# Patient Record
Sex: FEMALE | Race: OTHER | URBAN - METROPOLITAN AREA
[De-identification: names, ages, dates, MRNs, and addresses within clinical notes are randomized per-mention and may not be internally consistent; named-entity substitution may affect disease eponyms.]

---

## 2023-07-19 ENCOUNTER — INITIAL PRENATAL (OUTPATIENT)
Age: 33
End: 2023-07-19

## 2023-07-19 VITALS
WEIGHT: 169 LBS | HEART RATE: 82 BPM | RESPIRATION RATE: 17 BRPM | HEIGHT: 65 IN | DIASTOLIC BLOOD PRESSURE: 57 MMHG | BODY MASS INDEX: 28.16 KG/M2 | SYSTOLIC BLOOD PRESSURE: 99 MMHG | TEMPERATURE: 97.7 F | OXYGEN SATURATION: 97 %

## 2023-07-19 DIAGNOSIS — Z76.89 ENCOUNTER TO ESTABLISH CARE: ICD-10-CM

## 2023-07-19 DIAGNOSIS — R30.0 DYSURIA: ICD-10-CM

## 2023-07-19 DIAGNOSIS — Z34.90 PREGNANCY, UNSPECIFIED GESTATIONAL AGE: Primary | ICD-10-CM

## 2023-07-19 DIAGNOSIS — N30.00 ACUTE CYSTITIS WITHOUT HEMATURIA: ICD-10-CM

## 2023-07-19 LAB
BILIRUBIN, URINE, POC: NEGATIVE
BLOOD URINE, POC: NEGATIVE
GLUCOSE URINE, POC: NEGATIVE
KETONES, URINE, POC: NORMAL
LEUKOCYTE ESTERASE, URINE, POC: NORMAL
NITRITE, URINE, POC: POSITIVE
PH, URINE, POC: 6 (ref 4.6–8)
PROTEIN,URINE, POC: NORMAL
SPECIFIC GRAVITY, URINE, POC: 1.02 (ref 1–1.03)
URINALYSIS CLARITY, POC: NORMAL
URINALYSIS COLOR, POC: NORMAL
UROBILINOGEN, POC: NORMAL

## 2023-07-19 PROCEDURE — 81003 URINALYSIS AUTO W/O SCOPE: CPT

## 2023-07-19 RX ORDER — CEPHALEXIN 500 MG/1
500 CAPSULE ORAL 4 TIMES DAILY
Qty: 28 CAPSULE | Refills: 0 | Status: SHIPPED | OUTPATIENT
Start: 2023-07-19 | End: 2023-07-26

## 2023-07-19 RX ORDER — IRON FUM,PS/FOLIC ACID/VITC/B3 125-1-40-3
1 CAPSULE ORAL DAILY
Qty: 90 CAPSULE | Refills: 3 | Status: SHIPPED | OUTPATIENT
Start: 2023-07-19

## 2023-07-19 ASSESSMENT — PATIENT HEALTH QUESTIONNAIRE - PHQ9
SUM OF ALL RESPONSES TO PHQ QUESTIONS 1-9: 1
2. FEELING DOWN, DEPRESSED OR HOPELESS: 0
SUM OF ALL RESPONSES TO PHQ QUESTIONS 1-9: 1
1. LITTLE INTEREST OR PLEASURE IN DOING THINGS: 1
SUM OF ALL RESPONSES TO PHQ9 QUESTIONS 1 & 2: 1

## 2023-07-22 LAB
BACTERIA SPEC CULT: ABNORMAL
CC UR VC: ABNORMAL
SERVICE CMNT-IMP: ABNORMAL

## 2023-07-25 NOTE — PROGRESS NOTES
Return OB Visit     Jorge Fregoso interpretor used due to language barrier  Subjective:   Joaquín Mcduffie 26 y.o.  at 36w3d  Estimated Date of Delivery: 23 by LMP    Recently moved from [place] and was receiving prenatal care there. 2 prior CS. Desires a third. Failure to progress was indication for first CS. Living with a friend here. Family is in [facility]. FOB lives in [facility] and is supportive. Works there. Patient reports feeling well. No new concerns at this time. Patient denies headache, visual disturbances, CP, SOB, RUQ pain. LOF: No  Vaginal bleeding: No  Fetal movement (after 20 weeks): Yes  Contractions: no  Taking prenatal vitamins: taking    Allergies- reviewed:   No Known Allergies  Medications- reviewed:   Current Outpatient Medications   Medication Sig    Fe Fum-FePoly-FA-Vit C-Vit B3 (FOLIVANE-F) 125-1 MG CAPS Take 1 tablet by mouth daily     No current facility-administered medications for this visit. Past Medical History- reviewed:  No past medical history on file.   Past Surgical History- reviewed:   Past Surgical History:   Procedure Laterality Date     SECTION  2005     Social History- reviewed:  Social History     Socioeconomic History    Marital status: Single     Spouse name: Not on file    Number of children: Not on file    Years of education: Not on file    Highest education level: Not on file   Occupational History    Not on file   Tobacco Use    Smoking status: Never    Smokeless tobacco: Never   Substance and Sexual Activity    Alcohol use: Never    Drug use: Never    Sexual activity: Not on file   Other Topics Concern    Not on file   Social History Narrative    Not on file     Social Determinants of Health     Financial Resource Strain: Not on file   Food Insecurity: Not on file   Transportation Needs: Not on file   Physical Activity: Not on file   Stress: Not on file   Social Connections: Not on file   Intimate Partner Violence: Not on file

## 2023-07-26 ENCOUNTER — ROUTINE PRENATAL (OUTPATIENT)
Age: 33
End: 2023-07-26

## 2023-07-26 VITALS
HEIGHT: 65 IN | SYSTOLIC BLOOD PRESSURE: 98 MMHG | TEMPERATURE: 97.9 F | DIASTOLIC BLOOD PRESSURE: 59 MMHG | HEART RATE: 96 BPM | WEIGHT: 170.4 LBS | BODY MASS INDEX: 28.39 KG/M2 | OXYGEN SATURATION: 96 % | RESPIRATION RATE: 13 BRPM

## 2023-07-26 DIAGNOSIS — Z3A.36 36 WEEKS GESTATION OF PREGNANCY: Primary | ICD-10-CM

## 2023-07-26 PROCEDURE — 90715 TDAP VACCINE 7 YRS/> IM: CPT

## 2023-07-26 PROCEDURE — 0502F SUBSEQUENT PRENATAL CARE: CPT

## 2023-07-26 NOTE — PROGRESS NOTES
Chief Complaint   Patient presents with    Routine Prenatal Visit     Declined any major concerns. Patient identified with 2 patient identifiers (name and D. O. B)    Patient is a  at 36w3d    Leakage of Fluid: NO  Vaginal Bleeding: NO  Fetal Movement if > 20 weeks: YES  Prenatal vitamins: YES  Having Contractions: NO  Pain: NO    LMP 2022       There is no immunization history on file for this patient. 1. Have you been to the ER, urgent care clinic since your last visit? Hospitalized since your last visit? No    2. Have you seen or consulted any other health care providers outside of the [address] [place] [address] since your last visit? Include any pap smears or colon screening.  No

## 2023-07-27 LAB — GLUCOSE 1H P 100 G GLC PO SERPL-MCNC: 133 MG/DL (ref 65–140)

## 2023-07-30 LAB
BACTERIA SPEC CULT: NORMAL
SERVICE CMNT-IMP: NORMAL

## 2023-08-02 ENCOUNTER — ROUTINE PRENATAL (OUTPATIENT)
Age: 33
End: 2023-08-02

## 2023-08-02 VITALS
HEIGHT: 65 IN | OXYGEN SATURATION: 97 % | RESPIRATION RATE: 20 BRPM | DIASTOLIC BLOOD PRESSURE: 59 MMHG | HEART RATE: 90 BPM | BODY MASS INDEX: 30.32 KG/M2 | WEIGHT: 182 LBS | TEMPERATURE: 97.7 F | SYSTOLIC BLOOD PRESSURE: 98 MMHG

## 2023-08-02 DIAGNOSIS — N30.00 ACUTE CYSTITIS WITHOUT HEMATURIA: ICD-10-CM

## 2023-08-02 DIAGNOSIS — Z3A.37 37 WEEKS GESTATION OF PREGNANCY: Primary | ICD-10-CM

## 2023-08-02 PROCEDURE — 0502F SUBSEQUENT PRENATAL CARE: CPT

## 2023-08-02 SDOH — ECONOMIC STABILITY: INCOME INSECURITY: HOW HARD IS IT FOR YOU TO PAY FOR THE VERY BASICS LIKE FOOD, HOUSING, MEDICAL CARE, AND HEATING?: NOT HARD AT ALL

## 2023-08-02 SDOH — ECONOMIC STABILITY: HOUSING INSECURITY
IN THE LAST 12 MONTHS, WAS THERE A TIME WHEN YOU DID NOT HAVE A STEADY PLACE TO SLEEP OR SLEPT IN A SHELTER (INCLUDING NOW)?: NO

## 2023-08-02 SDOH — ECONOMIC STABILITY: FOOD INSECURITY: WITHIN THE PAST 12 MONTHS, THE FOOD YOU BOUGHT JUST DIDN'T LAST AND YOU DIDN'T HAVE MONEY TO GET MORE.: NEVER TRUE

## 2023-08-02 SDOH — ECONOMIC STABILITY: FOOD INSECURITY: WITHIN THE PAST 12 MONTHS, YOU WORRIED THAT YOUR FOOD WOULD RUN OUT BEFORE YOU GOT MONEY TO BUY MORE.: NEVER TRUE

## 2023-08-02 NOTE — PROGRESS NOTES
I reviewed with the resident the medical history and the resident's findings on the physical examination. I discussed with the resident the patient's diagnosis and concur with the plan.     27yo  at 37w3d      IUP: Rh pos  normal anatomy - unknown placental positioning  GBS neg  Abnormal GTT: 133, needs 3hr GTT   Transfer of care: at 35wk from [facility]/[place]  labs on cell phone - attempting to obtain complete records today   AMA  Hx CS x2: repeat scheduled 8/15 at 9:30, pt to arrive at 7:30 fasting - inform next visit, chart sent to Me[facility] Mcgregor (may need to reschedule if desires salpingectomy)   UTI: E. Coli, Keflex, culture LAINEY today     Desires tubal - will try to coordinate next week doing at [facility] versus switching to salpingectomy here   Unsure if applied Medicaid   Estimated Date of Delivery: 23

## 2023-08-02 NOTE — PROGRESS NOTES
Return OB Visit       Subjective:   Boone Kuo 26 y.o. [id number]  THAD: 2023, Alternate THAD Entry  GA:  37w3d. LOF: no  Vaginal bleeding: no  Fetal movement (after 20 weeks): yes  Contractions: no  Prenatal vitamins: yes    Pt denies fever, chills, HA, vision disturbances, RUQ pain, chest pain, SOB, N/V, urinary problems, foul smelling vaginal discharge. Allergies- reviewed:   No Known Allergies  Medications- reviewed:   Current Outpatient Medications   Medication Sig    nitrofurantoin, macrocrystal-monohydrate, (MACROBID) 100 MG capsule Take 1 capsule by mouth 2 times daily for 10 days    Fe Fum-FePoly-FA-Vit C-Vit B3 (FOLIVANE-F) 125-1 MG CAPS Take 1 tablet by mouth daily     No current facility-administered medications for this visit. Past Medical History- reviewed:  No past medical history on file. Past Surgical History- reviewed:   Past Surgical History:   Procedure Laterality Date     SECTION  2005     Social History- reviewed:  Social History     Socioeconomic History    Marital status: Single     Spouse name: Not on file    Number of children: Not on file    Years of education: Not on file    Highest education level: Not on file   Occupational History    Not on file   Tobacco Use    Smoking status: Never    Smokeless tobacco: Never   Substance and Sexual Activity    Alcohol use: Never    Drug use: Never    Sexual activity: Not on file   Other Topics Concern    Not on file   Social History Narrative    Not on file     Social Determinants of Health     Financial Resource Strain: Low Risk     Difficulty of Paying Living Expenses: Not hard at all   Food Insecurity: No Food Insecurity    Worried About Running Out of Food in the Last Year: Never true    [address] in the Last Year: Never true   Transportation Needs: Unknown    Lack of Transportation (Medical): Not on file    Lack of Transportation (Non-Medical):  No   Physical Activity: Not on file   Stress: Not

## 2023-08-02 NOTE — PROGRESS NOTES
Session [id number]  : [id number]  Here today for routine prenatal visit. Scheduled for    Denies abdominal pain and contractions. Reports lower back pain  Denies vaginal bleeding, fluid, and discharge  Reports active fetal movement.

## 2023-08-03 ENCOUNTER — NURSE ONLY (OUTPATIENT)
Age: 33
End: 2023-08-03

## 2023-08-03 DIAGNOSIS — Z3A.37 37 WEEKS GESTATION OF PREGNANCY: ICD-10-CM

## 2023-08-03 DIAGNOSIS — N30.00 ACUTE CYSTITIS WITHOUT HEMATURIA: ICD-10-CM

## 2023-08-03 DIAGNOSIS — R42 DIZZINESS: Primary | ICD-10-CM

## 2023-08-03 LAB — GLUCOSE, POC: 187 MG/DL

## 2023-08-03 PROCEDURE — 82962 GLUCOSE BLOOD TEST: CPT | Performed by: FAMILY MEDICINE

## 2023-08-03 PROCEDURE — PBSHW AMB POC GLUCOSE BLOOD, BY GLUCOSE MONITORING DEVICE: Performed by: FAMILY MEDICINE

## 2023-08-03 NOTE — PROGRESS NOTES
Patient was here today to get her 3 hour GTT due to previous abnormal 1 hour GTT. Patient is 37 weeks and 4 days. She was at to [facility] with no previous history of gestational diabetes. lea Michelle brought to my attention that the patient was sitting at the lab and was feeling bad. I went over to the patient and she stated she was having chills, body pain and dizziness. We helped her up into an exam table and laid her down on her left side with her feet elevated. We put a blanket and sheet over her since she stated she was freezing. Nely Almaguer LPN checked her vitals. Vitals were normal Glucose was also checked. BP: 100/70  Ox:96  P: 105  Temp 97.3  Glucose:187   RAPHAEL Christensen gave patient a cup of water. We observed patient for 5 minutes. I asked patient how she was felling and she said she starting to feel better. I monitered patient until 1:38 am. I rechecked her vitals. /70  Temp:97.5  P:88  Ox:97  Patient mentioned she was not feeling dizzy anymore, but was starting to get a headache. I gave her more water. I asked patient when she last ate something and she stated 9 pm last night. I asked patient if she was okay to sit up and she said yes. She sat on edge of the table for at least 5 minutes. She said she was good to stand up and sit in the lab chair. Patient was moved over and was well. She appeared to be doing better. I gave the patient the last cup of water and asked her to please let us know if she starts feeling bad again. I verbally talked to lea Michelle to let her know where patient was and if she needed additional help to come grab me. I talked to  about this patient and she was aware about the situation.

## 2023-08-04 LAB
GESTATIONAL 3HR GTT: ABNORMAL
GLUCOSE 1H P 100 G GLC PO SERPL-MCNC: 178 MG/DL (ref 65–180)
GLUCOSE 2 HOUR: 191 MG/DL (ref 65–155)
GLUCOSE P FAST SERPL-MCNC: 84 MG/DL (ref 65–95)
GLUCOSE, 3 HOUR: 113 MG/DL (ref 65–140)

## 2023-08-04 RX ORDER — NITROFURANTOIN 25; 75 MG/1; MG/1
100 CAPSULE ORAL 2 TIMES DAILY
Qty: 20 CAPSULE | Refills: 0 | Status: SHIPPED | OUTPATIENT
Start: 2023-08-04 | End: 2023-08-09

## 2023-08-06 LAB
BACTERIA SPEC CULT: ABNORMAL
CC UR VC: ABNORMAL
SERVICE CMNT-IMP: ABNORMAL

## 2023-08-09 ENCOUNTER — ROUTINE PRENATAL (OUTPATIENT)
Age: 33
End: 2023-08-09

## 2023-08-09 VITALS
HEIGHT: 65 IN | TEMPERATURE: 98.6 F | WEIGHT: 171 LBS | BODY MASS INDEX: 28.49 KG/M2 | RESPIRATION RATE: 18 BRPM | DIASTOLIC BLOOD PRESSURE: 61 MMHG | OXYGEN SATURATION: 97 % | HEART RATE: 87 BPM | SYSTOLIC BLOOD PRESSURE: 99 MMHG

## 2023-08-09 DIAGNOSIS — Z98.891 HISTORY OF CESAREAN DELIVERY: ICD-10-CM

## 2023-08-09 DIAGNOSIS — Z34.80 SUPERVISION OF OTHER NORMAL PREGNANCY, ANTEPARTUM: Primary | ICD-10-CM

## 2023-08-09 PROCEDURE — 0502F SUBSEQUENT PRENATAL CARE: CPT | Performed by: FAMILY MEDICINE

## 2023-08-09 ASSESSMENT — PATIENT HEALTH QUESTIONNAIRE - PHQ9
SUM OF ALL RESPONSES TO PHQ QUESTIONS 1-9: 1
2. FEELING DOWN, DEPRESSED OR HOPELESS: 0
SUM OF ALL RESPONSES TO PHQ QUESTIONS 1-9: 1
SUM OF ALL RESPONSES TO PHQ9 QUESTIONS 1 & 2: 1
SUM OF ALL RESPONSES TO PHQ QUESTIONS 1-9: 1
SUM OF ALL RESPONSES TO PHQ QUESTIONS 1-9: 1
2. FEELING DOWN, DEPRESSED OR HOPELESS: 0
SUM OF ALL RESPONSES TO PHQ QUESTIONS 1-9: 1
SUM OF ALL RESPONSES TO PHQ9 QUESTIONS 1 & 2: 1
SUM OF ALL RESPONSES TO PHQ QUESTIONS 1-9: 1
1. LITTLE INTEREST OR PLEASURE IN DOING THINGS: 1
1. LITTLE INTEREST OR PLEASURE IN DOING THINGS: 1

## 2023-08-09 NOTE — PROGRESS NOTES
Chief Complaint   Patient presents with    Routine Prenatal Visit     Patient is 38 weeks and 3 days. She is not having vaginal bleeding or discharge. She is taking prenatal vitamins. She is having fetal movement. She just contractions on Monday. No other concerns.       Wants salpingectomy - had Medicaid in [place] before moving and signed consent  was told she was waiting for Medicaid authorization here (already applied)  no family history of cancer     29yo  at 38w3d by ultrasound    IUP: Rh pos  normal anatomy - unknown placental positioning  GBS neg  Abnormal GTT: 133  3hr GTT okay (only one abnormal)  Transfer of care: at 35wk from [facility]/[place]  labs in care everywhere   AMA  Hx C/S x2: repeat scheduled 8/15 at 9:30, pt to arrive at 7:30 fasting - pt aware, anterior placenta  desires salpingectomy given AMA, increased risk of complications in further pregnancies as she would require  section and increased risk of tissue adhesion/scarring and bleeding  discussed with ethics over [employer] on 8/10 - need to page morning of surgery to drop note prior**  UTI: E. Coli, Keflex, culture LAINEY with persistent E.coli, did not take Macrobid - was still taking Keflex when urine collected, now asymptomatic       Estimated Date of Delivery: 23